# Patient Record
Sex: FEMALE | Race: WHITE | URBAN - NONMETROPOLITAN AREA
[De-identification: names, ages, dates, MRNs, and addresses within clinical notes are randomized per-mention and may not be internally consistent; named-entity substitution may affect disease eponyms.]

---

## 2024-01-18 ENCOUNTER — TELEPHONE (OUTPATIENT)
Dept: OBGYN CLINIC | Age: 34
End: 2024-01-18

## 2024-01-18 NOTE — TELEPHONE ENCOUNTER
Pt medical records have been received.  pt is 36.5 weeks  and there are complications. records were just scanned in.  The pt stated that the baby has XXY syndrome.   Pt has gestational diabetes and a heavy bleeder during delivery.  Please review and contact the pt.

## 2024-01-29 ENCOUNTER — TELEPHONE (OUTPATIENT)
Dept: OBGYN CLINIC | Age: 34
End: 2024-01-29

## 2024-02-01 ENCOUNTER — INITIAL PRENATAL (OUTPATIENT)
Dept: OBGYN CLINIC | Age: 34
End: 2024-02-01

## 2024-02-01 VITALS — WEIGHT: 210 LBS | DIASTOLIC BLOOD PRESSURE: 72 MMHG | BODY MASS INDEX: 32.89 KG/M2 | SYSTOLIC BLOOD PRESSURE: 112 MMHG

## 2024-02-01 DIAGNOSIS — O24.410 DIET CONTROLLED GESTATIONAL DIABETES MELLITUS (GDM) IN THIRD TRIMESTER: ICD-10-CM

## 2024-02-01 DIAGNOSIS — L29.3 GENITAL PRURITUS: ICD-10-CM

## 2024-02-01 DIAGNOSIS — Z36.85 ENCOUNTER FOR ANTENATAL SCREENING FOR STREPTOCOCCUS B: ICD-10-CM

## 2024-02-01 DIAGNOSIS — O26.86 PUPP (PRURITIC URTICARIAL PAPULES AND PLAQUES OF PREGNANCY): ICD-10-CM

## 2024-02-01 DIAGNOSIS — O09.523 AMA (ADVANCED MATERNAL AGE) MULTIGRAVIDA 35+, THIRD TRIMESTER: ICD-10-CM

## 2024-02-01 DIAGNOSIS — O09.90 SUPERVISION OF HIGH RISK PREGNANCY, ANTEPARTUM: Primary | ICD-10-CM

## 2024-02-01 RX ORDER — TRIAMCINOLONE ACETONIDE 0.25 MG/G
OINTMENT TOPICAL
Qty: 1 EACH | Refills: 1 | Status: SHIPPED | OUTPATIENT
Start: 2024-02-01 | End: 2024-02-02 | Stop reason: SDUPTHER

## 2024-02-01 NOTE — PROGRESS NOTES
Kat Moise is a 33 y.o. female 37w3d who presents to transfer prenatal visit.  The patient was seen and evaluated. There was positive fetal movements. No contractions, bleeding or leakage of fluid. Signs and symptoms of  labor as well as labor were reviewed. The S/S of Pre-Eclampsia were reviewed with the patient in detail. She is to report any of these if they occur. She currently denies any of these.  Pt was being seen in [place], her last appt was over a month ago.   She failed her Glucose 1 hr and 3 hr glucose.   Pt is checking her blood sugars 3 times per day, fasting in am (95), and 2 hours after meals (110-112). If one hour after meals they are around 125.   Pt feels like she may have had occasional contractions.        Kat Moise is a 33 y.o. female with the following history as recorded in [employer]:  There are no problems to display for this patient.    Current Outpatient Medications on File Prior to Visit   Medication Sig Dispense Refill    Prenatal Vit-DSS-Fe Cbn-FA (PRENATAL AD PO) Take  by mouth.       No current facility-administered medications on file prior to visit.     Allergies: Keflex [cephalexin]  History reviewed. No pertinent past medical history.  History reviewed. No pertinent surgical history.  History reviewed. No pertinent family history.  Social History     Tobacco Use    Smoking status: Every Day     Current packs/day: 0.50     Types: Cigarettes    Smokeless tobacco: Not on file   Substance Use Topics    Alcohol use: No         Mother's Prenatal Vitals  BP: 112/72  Weight - Scale: 95.3 kg (210 lb)  Patient Position: Lying left side  Prenatal Fetal Information  Fetal HR: nst 141  Movement: Present  Physical Exam  Constitutional:       General: She is not in acute distress.     Appearance: Normal appearance. She is not ill-appearing, toxic-appearing or diaphoretic.   HENT:      Head: Normocephalic and atraumatic.   Eyes:      Extraocular Movements: Extraocular 
Patient presents today for routine prenatal care. Pt denies any vaginal leaking bleeding or contractions. + Fetal movement.   Pt states baby has been breech.  
10-Aug-2023 21:32

## 2024-02-02 DIAGNOSIS — Z11.3 SCREENING FOR STD (SEXUALLY TRANSMITTED DISEASE): Primary | ICD-10-CM

## 2024-02-02 DIAGNOSIS — L29.3 GENITAL PRURITUS: ICD-10-CM

## 2024-02-02 LAB — GP B STREP DNA SPEC QL NAA+PROBE: NOT DETECTED

## 2024-02-02 RX ORDER — AZITHROMYCIN 250 MG/1
250 TABLET, FILM COATED ORAL SEE ADMIN INSTRUCTIONS
Qty: 6 TABLET | Refills: 0 | Status: SHIPPED | OUTPATIENT
Start: 2024-02-02 | End: 2024-02-07

## 2024-02-02 RX ORDER — BETAMETHASONE DIPROPIONATE 0.05 %
OINTMENT (GRAM) TOPICAL
Qty: 45 G | Refills: 0 | Status: ON HOLD | OUTPATIENT
Start: 2024-02-02 | End: 2024-02-10

## 2024-02-02 RX ORDER — LORATADINE 10 MG/1
10 TABLET ORAL DAILY
Qty: 30 TABLET | Refills: 0 | Status: ON HOLD | OUTPATIENT
Start: 2024-02-02 | End: 2024-02-08

## 2024-02-02 RX ORDER — TRIAMCINOLONE ACETONIDE 0.25 MG/G
OINTMENT TOPICAL
Qty: 1 EACH | Refills: 1 | Status: SHIPPED | OUTPATIENT
Start: 2024-02-02 | End: 2024-02-09

## 2024-02-05 ENCOUNTER — HOSPITAL ENCOUNTER (OUTPATIENT)
Age: 34
Discharge: HOME | End: 2024-02-05
Attending: OBSTETRICS & GYNECOLOGY | Admitting: OBSTETRICS & GYNECOLOGY
Payer: MEDICAID

## 2024-02-05 PROCEDURE — 99213 OFFICE O/P EST LOW 20 MIN: CPT

## 2024-02-05 NOTE — FLOWSHEET NOTE
Pt presents to floor with complaints of lower abdominal pain and contractions.  She states no vaginal bleeding or loss of fluid.  Pt reports positive fetal movement.  Pt gowned, clean catch urine obtained, placed on monitors and admitted to observation.

## 2024-02-05 NOTE — DISCHARGE INSTRUCTIONS
LABOR AND DELIVERY - OBSERVATION DISCHARGE INSTRUCTIONS    TERM LABOR: RETURN TO HOSPITAL IF:  x__There is a leaking or sudden gush of fluid from the vagina (note color, odor, time and amount of fluid)    x__ You have bright red vaginal bleeding    x__You begin to have regular contractions, occuring every 2-3 minutes, each contraction lasting 45-60 seconds for one hour. Time contractions from beginning of one to the beginning of the next. True contractions have a regular rate and become progressively closer. They are not changed by position change and are usually more uncomfortable when walking.       NOTE: If you do not begin to feel better, or you have any questions, contact your physician or call [phone], or return to the hospital.

## 2024-02-06 ENCOUNTER — TELEPHONE (OUTPATIENT)
Dept: OBGYN CLINIC | Age: 34
End: 2024-02-06

## 2024-02-06 NOTE — TELEPHONE ENCOUNTER
Called pt to let her know that MFM could see her sooner 2/12/24 at 12:40pm. No answer lvm, for pt to return call.   Mallika DUMONT

## 2024-02-06 NOTE — TELEPHONE ENCOUNTER
Called & spoke with pt. Pt is scheduled for induction 2/8/24 at 9pm w/no restrictions. Pt was instructed to go through the ER, then go to the 2nd floor to L&D. Pt verbalized understanding.   Mallika DUMONT

## 2024-02-06 NOTE — TELEPHONE ENCOUNTER
Apt is canceled w/MFM pt is scheduled for induction 2/8/24, refer to telephone encounter.   Mallika DUMONT

## 2024-02-08 ENCOUNTER — HOSPITAL ENCOUNTER (INPATIENT)
Age: 34
LOS: 2 days | Discharge: HOME | End: 2024-02-10
Attending: OBSTETRICS & GYNECOLOGY | Admitting: OBSTETRICS & GYNECOLOGY
Payer: MEDICAID

## 2024-02-08 ENCOUNTER — APPOINTMENT (OUTPATIENT)
Dept: LABOR AND DELIVERY | Age: 34
End: 2024-02-08
Payer: MEDICAID

## 2024-02-08 DIAGNOSIS — Z3A.38 38 WEEKS GESTATION OF PREGNANCY: ICD-10-CM

## 2024-02-08 LAB
ABO/RH: NORMAL
ABO/RH: NORMAL
AMPHET UR QL SCN: NEGATIVE
ANTIBODY SCREEN: NORMAL
BARBITURATES UR QL SCN: NEGATIVE
BASOPHILS # BLD: 0 K/UL (ref 0–0.2)
BASOPHILS NFR BLD: 0.3 % (ref 0–1)
BENZODIAZ UR QL SCN: NEGATIVE
BUPRENORPHINE URINE: NEGATIVE
C TRACH DNA CVX QL NAA+PROBE: NOT DETECTED
CANNABINOIDS UR QL SCN: POSITIVE
COCAINE UR QL SCN: NEGATIVE
DRUG SCREEN COMMENT UR-IMP: ABNORMAL
EOSINOPHIL # BLD: 0.1 K/UL (ref 0–0.6)
EOSINOPHIL NFR BLD: 0.7 % (ref 0–5)
ERYTHROCYTE [DISTWIDTH] IN BLOOD BY AUTOMATED COUNT: 15.6 % (ref 11.5–14.5)
FENTANYL SCREEN, URINE: NEGATIVE
HCT VFR BLD AUTO: 30.8 % (ref 37–47)
HGB BLD-MCNC: 10.1 G/DL (ref 12–16)
IMM GRANULOCYTES # BLD: 0.1 K/UL
LYMPHOCYTES # BLD: 3.5 K/UL (ref 1.1–4.5)
LYMPHOCYTES NFR BLD: 22.1 % (ref 20–40)
MCH RBC QN AUTO: 31.4 PG (ref 27–31)
MCHC RBC AUTO-ENTMCNC: 32.8 G/DL (ref 33–37)
MCV RBC AUTO: 95.7 FL (ref 81–99)
METHADONE UR QL SCN: NEGATIVE
METHAMPHETAMINE, URINE: NEGATIVE
MONOCYTES # BLD: 0.9 K/UL (ref 0–0.9)
MONOCYTES NFR BLD: 5.4 % (ref 0–10)
N GONORRHOEA DNA CERV MUCUS QL NAA+PROBE: NOT DETECTED
NEUTROPHILS # BLD: 11.2 K/UL (ref 1.5–7.5)
NEUTS SEG NFR BLD: 71.1 % (ref 50–65)
OPIATES UR QL SCN: NEGATIVE
OXYCODONE UR QL SCN: NEGATIVE
PCP UR QL SCN: NEGATIVE
PLATELET # BLD AUTO: 327 K/UL (ref 130–400)
PMV BLD AUTO: 10.2 FL (ref 9.4–12.3)
RBC # BLD AUTO: 3.22 M/UL (ref 4.2–5.4)
T VAGINALIS DNA GENITAL QL NAA+PROBE: NOT DETECTED
TRICYCLIC, URINE: NEGATIVE
WBC # BLD AUTO: 15.8 K/UL (ref 4.8–10.8)

## 2024-02-08 PROCEDURE — 87491 CHLMYD TRACH DNA AMP PROBE: CPT

## 2024-02-08 PROCEDURE — 86592 SYPHILIS TEST NON-TREP QUAL: CPT

## 2024-02-08 PROCEDURE — 85025 COMPLETE CBC W/AUTO DIFF WBC: CPT

## 2024-02-08 PROCEDURE — 86850 RBC ANTIBODY SCREEN: CPT

## 2024-02-08 PROCEDURE — 1220000000 HC SEMI PRIVATE OB R&B

## 2024-02-08 PROCEDURE — 87591 N.GONORRHOEAE DNA AMP PROB: CPT

## 2024-02-08 PROCEDURE — 80307 DRUG TEST PRSMV CHEM ANLYZR: CPT

## 2024-02-08 PROCEDURE — 86901 BLOOD TYPING SEROLOGIC RH(D): CPT

## 2024-02-08 PROCEDURE — 87661 TRICHOMONAS VAGINALIS AMPLIF: CPT

## 2024-02-08 PROCEDURE — 86900 BLOOD TYPING SEROLOGIC ABO: CPT

## 2024-02-08 PROCEDURE — G0480 DRUG TEST DEF 1-7 CLASSES: HCPCS

## 2024-02-08 PROCEDURE — 36415 COLL VENOUS BLD VENIPUNCTURE: CPT

## 2024-02-08 RX ORDER — MISOPROSTOL 200 UG/1
800 TABLET ORAL PRN
Status: DISCONTINUED | OUTPATIENT
Start: 2024-02-08 | End: 2024-02-10 | Stop reason: HOSPADM

## 2024-02-08 RX ORDER — SODIUM CHLORIDE 0.9 % (FLUSH) 0.9 %
5-40 SYRINGE (ML) INJECTION EVERY 12 HOURS SCHEDULED
Status: DISCONTINUED | OUTPATIENT
Start: 2024-02-08 | End: 2024-02-09

## 2024-02-08 RX ORDER — SODIUM CHLORIDE 9 MG/ML
25 INJECTION, SOLUTION INTRAVENOUS PRN
Status: DISCONTINUED | OUTPATIENT
Start: 2024-02-08 | End: 2024-02-09

## 2024-02-08 RX ORDER — SODIUM CHLORIDE, SODIUM LACTATE, POTASSIUM CHLORIDE, AND CALCIUM CHLORIDE .6; .31; .03; .02 G/100ML; G/100ML; G/100ML; G/100ML
500 INJECTION, SOLUTION INTRAVENOUS PRN
Status: DISCONTINUED | OUTPATIENT
Start: 2024-02-08 | End: 2024-02-09

## 2024-02-08 RX ORDER — DOCUSATE SODIUM 100 MG/1
100 CAPSULE, LIQUID FILLED ORAL 2 TIMES DAILY
Status: DISCONTINUED | OUTPATIENT
Start: 2024-02-08 | End: 2024-02-09

## 2024-02-08 RX ORDER — SODIUM CHLORIDE 0.9 % (FLUSH) 0.9 %
5-40 SYRINGE (ML) INJECTION PRN
Status: DISCONTINUED | OUTPATIENT
Start: 2024-02-08 | End: 2024-02-09

## 2024-02-08 RX ORDER — SODIUM CHLORIDE, SODIUM LACTATE, POTASSIUM CHLORIDE, AND CALCIUM CHLORIDE .6; .31; .03; .02 G/100ML; G/100ML; G/100ML; G/100ML
1000 INJECTION, SOLUTION INTRAVENOUS PRN
Status: DISCONTINUED | OUTPATIENT
Start: 2024-02-08 | End: 2024-02-09

## 2024-02-08 RX ORDER — METHYLERGONOVINE MALEATE 0.2 MG/ML
200 INJECTION INTRAVENOUS PRN
Status: DISCONTINUED | OUTPATIENT
Start: 2024-02-08 | End: 2024-02-10 | Stop reason: HOSPADM

## 2024-02-08 RX ORDER — SODIUM CHLORIDE, SODIUM LACTATE, POTASSIUM CHLORIDE, CALCIUM CHLORIDE 600; 310; 30; 20 MG/100ML; MG/100ML; MG/100ML; MG/100ML
INJECTION, SOLUTION INTRAVENOUS CONTINUOUS
Status: DISCONTINUED | OUTPATIENT
Start: 2024-02-08 | End: 2024-02-09

## 2024-02-08 RX ORDER — ONDANSETRON 2 MG/ML
4 INJECTION INTRAMUSCULAR; INTRAVENOUS EVERY 6 HOURS PRN
Status: DISCONTINUED | OUTPATIENT
Start: 2024-02-08 | End: 2024-02-09 | Stop reason: SDUPTHER

## 2024-02-08 RX ORDER — CARBOPROST TROMETHAMINE 250 UG/ML
250 INJECTION, SOLUTION INTRAMUSCULAR PRN
Status: DISCONTINUED | OUTPATIENT
Start: 2024-02-08 | End: 2024-02-10 | Stop reason: HOSPADM

## 2024-02-08 SDOH — ECONOMIC STABILITY: INCOME INSECURITY: HOW HARD IS IT FOR YOU TO PAY FOR THE VERY BASICS LIKE FOOD, HOUSING, MEDICAL CARE, AND HEATING?: SOMEWHAT HARD

## 2024-02-08 SDOH — ECONOMIC STABILITY: FOOD INSECURITY: WITHIN THE PAST 12 MONTHS, YOU WORRIED THAT YOUR FOOD WOULD RUN OUT BEFORE YOU GOT MONEY TO BUY MORE.: NEVER TRUE

## 2024-02-08 SDOH — ECONOMIC STABILITY: INCOME INSECURITY: IN THE PAST 12 MONTHS, HAS THE ELECTRIC, GAS, OIL, OR WATER COMPANY THREATENED TO SHUT OFF SERVICE IN YOUR HOME?: NO

## 2024-02-08 ASSESSMENT — PATIENT HEALTH QUESTIONNAIRE - PHQ9
2. FEELING DOWN, DEPRESSED OR HOPELESS: 0
SUM OF ALL RESPONSES TO PHQ QUESTIONS 1-9: 0
SUM OF ALL RESPONSES TO PHQ QUESTIONS 1-9: 0
SUM OF ALL RESPONSES TO PHQ9 QUESTIONS 1 & 2: 0
SUM OF ALL RESPONSES TO PHQ QUESTIONS 1-9: 0
1. LITTLE INTEREST OR PLEASURE IN DOING THINGS: 0
SUM OF ALL RESPONSES TO PHQ QUESTIONS 1-9: 0

## 2024-02-09 ENCOUNTER — ANESTHESIA EVENT (OUTPATIENT)
Dept: LABOR AND DELIVERY | Age: 34
End: 2024-02-09
Payer: MEDICAID

## 2024-02-09 ENCOUNTER — ANESTHESIA (OUTPATIENT)
Dept: LABOR AND DELIVERY | Age: 34
End: 2024-02-09
Payer: MEDICAID

## 2024-02-09 LAB
ABO/RH: NORMAL
ALLENS TEST: ABNORMAL
BASE EXCESS ARTERIAL: -1 MMOL/L (ref -2–2)
BASOPHILS # BLD: 0.1 K/UL (ref 0–0.2)
BASOPHILS # BLD: 0.1 K/UL (ref 0–0.2)
BASOPHILS NFR BLD: 0.3 % (ref 0–1)
BASOPHILS NFR BLD: 0.3 % (ref 0–1)
CARBOXYHEMOGLOBIN ARTERIAL: 1.8 % (ref 0–5)
EOSINOPHIL # BLD: 0.1 K/UL (ref 0–0.6)
EOSINOPHIL # BLD: 0.1 K/UL (ref 0–0.6)
EOSINOPHIL NFR BLD: 0.3 % (ref 0–5)
EOSINOPHIL NFR BLD: 0.6 % (ref 0–5)
ERYTHROCYTE [DISTWIDTH] IN BLOOD BY AUTOMATED COUNT: 15.7 % (ref 11.5–14.5)
ERYTHROCYTE [DISTWIDTH] IN BLOOD BY AUTOMATED COUNT: 15.8 % (ref 11.5–14.5)
FETAL SCREEN: NORMAL
HCO3 ARTERIAL: 27.3 MMOL/L (ref 22–26)
HCT VFR BLD AUTO: 27.4 % (ref 37–47)
HCT VFR BLD AUTO: 28.4 % (ref 37–47)
HEMOGLOBIN, ART, EXTENDED: 16.4 G/DL (ref 12–16)
HGB BLD-MCNC: 9 G/DL (ref 12–16)
HGB BLD-MCNC: 9.1 G/DL (ref 12–16)
IMM GRANULOCYTES # BLD: 0.1 K/UL
IMM GRANULOCYTES # BLD: 0.1 K/UL
LYMPHOCYTES # BLD: 3.2 K/UL (ref 1.1–4.5)
LYMPHOCYTES # BLD: 4.9 K/UL (ref 1.1–4.5)
LYMPHOCYTES NFR BLD: 16.7 % (ref 20–40)
LYMPHOCYTES NFR BLD: 21.6 % (ref 20–40)
MCH RBC QN AUTO: 30.6 PG (ref 27–31)
MCH RBC QN AUTO: 31.6 PG (ref 27–31)
MCHC RBC AUTO-ENTMCNC: 32 G/DL (ref 33–37)
MCHC RBC AUTO-ENTMCNC: 32.8 G/DL (ref 33–37)
MCV RBC AUTO: 95.6 FL (ref 81–99)
MCV RBC AUTO: 96.1 FL (ref 81–99)
METHEMOGLOBIN ARTERIAL: 3.5 %
MONOCYTES # BLD: 1 K/UL (ref 0–0.9)
MONOCYTES # BLD: 1.2 K/UL (ref 0–0.9)
MONOCYTES NFR BLD: 5.2 % (ref 0–10)
MONOCYTES NFR BLD: 5.4 % (ref 0–10)
NEUTROPHILS # BLD: 14.8 K/UL (ref 1.5–7.5)
NEUTROPHILS # BLD: 16.2 K/UL (ref 1.5–7.5)
NEUTS SEG NFR BLD: 71.6 % (ref 50–65)
NEUTS SEG NFR BLD: 76.9 % (ref 50–65)
O2 CONTENT ARTERIAL: 9.1 ML/DL
O2 SAT, ARTERIAL: 39.5 %
O2 THERAPY: ABNORMAL
PCO2 ARTERIAL: 58 MMHG (ref 35–45)
PH ARTERIAL: 7.28 (ref 7.35–7.45)
PLATELET # BLD AUTO: 301 K/UL (ref 130–400)
PLATELET # BLD AUTO: 328 K/UL (ref 130–400)
PMV BLD AUTO: 10.4 FL (ref 9.4–12.3)
PMV BLD AUTO: 10.7 FL (ref 9.4–12.3)
PO2 ARTERIAL: 31 MMHG (ref 80–100)
POTASSIUM BLD-SCNC: 4.7 MMOL/L
RBC # BLD AUTO: 2.85 M/UL (ref 4.2–5.4)
RBC # BLD AUTO: 2.97 M/UL (ref 4.2–5.4)
RHIG LOT NUMBER: NORMAL
SAMPLE SOURCE: ABNORMAL
WBC # BLD AUTO: 19.2 K/UL (ref 4.8–10.8)
WBC # BLD AUTO: 22.6 K/UL (ref 4.8–10.8)

## 2024-02-09 PROCEDURE — 6360000002 HC RX W HCPCS: Performed by: OBSTETRICS & GYNECOLOGY

## 2024-02-09 PROCEDURE — 1220000000 HC SEMI PRIVATE OB R&B

## 2024-02-09 PROCEDURE — 36415 COLL VENOUS BLD VENIPUNCTURE: CPT

## 2024-02-09 PROCEDURE — 59409 OBSTETRICAL CARE: CPT | Performed by: OBSTETRICS & GYNECOLOGY

## 2024-02-09 PROCEDURE — 7200000001 HC VAGINAL DELIVERY

## 2024-02-09 PROCEDURE — 2500000003 HC RX 250 WO HCPCS: Performed by: OBSTETRICS & GYNECOLOGY

## 2024-02-09 PROCEDURE — 85025 COMPLETE CBC W/AUTO DIFF WBC: CPT

## 2024-02-09 PROCEDURE — 86900 BLOOD TYPING SEROLOGIC ABO: CPT

## 2024-02-09 PROCEDURE — 10907ZC DRAINAGE OF AMNIOTIC FLUID, THERAPEUTIC FROM PRODUCTS OF CONCEPTION, VIA NATURAL OR ARTIFICIAL OPENING: ICD-10-PCS | Performed by: OBSTETRICS & GYNECOLOGY

## 2024-02-09 PROCEDURE — 36600 WITHDRAWAL OF ARTERIAL BLOOD: CPT

## 2024-02-09 PROCEDURE — 6370000000 HC RX 637 (ALT 250 FOR IP): Performed by: OBSTETRICS & GYNECOLOGY

## 2024-02-09 PROCEDURE — 2580000003 HC RX 258: Performed by: OBSTETRICS & GYNECOLOGY

## 2024-02-09 PROCEDURE — 82803 BLOOD GASES ANY COMBINATION: CPT

## 2024-02-09 PROCEDURE — 85461 HEMOGLOBIN FETAL: CPT

## 2024-02-09 PROCEDURE — 3700000025 EPIDURAL BLOCK: Performed by: NURSE ANESTHETIST, CERTIFIED REGISTERED

## 2024-02-09 PROCEDURE — 86901 BLOOD TYPING SEROLOGIC RH(D): CPT

## 2024-02-09 PROCEDURE — 6360000002 HC RX W HCPCS: Performed by: NURSE ANESTHETIST, CERTIFIED REGISTERED

## 2024-02-09 RX ORDER — IBUPROFEN 400 MG/1
800 TABLET ORAL EVERY 8 HOURS
Status: DISCONTINUED | OUTPATIENT
Start: 2024-02-09 | End: 2024-02-10 | Stop reason: HOSPADM

## 2024-02-09 RX ORDER — ACETAMINOPHEN 500 MG
1000 TABLET ORAL EVERY 8 HOURS
Status: DISCONTINUED | OUTPATIENT
Start: 2024-02-09 | End: 2024-02-10 | Stop reason: HOSPADM

## 2024-02-09 RX ORDER — NALOXONE HYDROCHLORIDE 0.4 MG/ML
INJECTION, SOLUTION INTRAMUSCULAR; INTRAVENOUS; SUBCUTANEOUS PRN
Status: DISCONTINUED | OUTPATIENT
Start: 2024-02-09 | End: 2024-02-09 | Stop reason: HOSPADM

## 2024-02-09 RX ORDER — SODIUM CHLORIDE 9 MG/ML
INJECTION, SOLUTION INTRAVENOUS PRN
Status: DISCONTINUED | OUTPATIENT
Start: 2024-02-09 | End: 2024-02-10 | Stop reason: HOSPADM

## 2024-02-09 RX ORDER — ROPIVACAINE HYDROCHLORIDE 2 MG/ML
13 INJECTION, SOLUTION EPIDURAL; INFILTRATION; PERINEURAL CONTINUOUS
Status: DISCONTINUED | OUTPATIENT
Start: 2024-02-09 | End: 2024-02-09

## 2024-02-09 RX ORDER — FENTANYL CITRATE 50 UG/ML
50 INJECTION, SOLUTION INTRAMUSCULAR; INTRAVENOUS ONCE
Status: COMPLETED | OUTPATIENT
Start: 2024-02-09 | End: 2024-02-09

## 2024-02-09 RX ORDER — DOCUSATE SODIUM 100 MG/1
100 CAPSULE, LIQUID FILLED ORAL 2 TIMES DAILY
Status: DISCONTINUED | OUTPATIENT
Start: 2024-02-09 | End: 2024-02-10 | Stop reason: HOSPADM

## 2024-02-09 RX ORDER — ROPIVACAINE HYDROCHLORIDE 2 MG/ML
INJECTION, SOLUTION EPIDURAL; INFILTRATION; PERINEURAL CONTINUOUS PRN
Status: DISCONTINUED | OUTPATIENT
Start: 2024-02-09 | End: 2024-02-09 | Stop reason: SDUPTHER

## 2024-02-09 RX ORDER — IBUPROFEN 400 MG/1
800 TABLET ORAL EVERY 8 HOURS
Status: DISCONTINUED | OUTPATIENT
Start: 2024-02-09 | End: 2024-02-09

## 2024-02-09 RX ORDER — EPHEDRINE SULFATE/0.9% NACL/PF 50 MG/5 ML
5 SYRINGE (ML) INTRAVENOUS ONCE
OUTPATIENT
Start: 2024-02-09 | End: 2024-02-09

## 2024-02-09 RX ORDER — FENTANYL CITRATE-0.9 % NACL/PF 10 MCG/ML
50 PLASTIC BAG, INJECTION (ML) INTRAVENOUS ONCE
Status: DISCONTINUED | OUTPATIENT
Start: 2024-02-09 | End: 2024-02-09

## 2024-02-09 RX ORDER — SODIUM CHLORIDE 0.9 % (FLUSH) 0.9 %
5-40 SYRINGE (ML) INJECTION PRN
Status: DISCONTINUED | OUTPATIENT
Start: 2024-02-09 | End: 2024-02-10 | Stop reason: HOSPADM

## 2024-02-09 RX ORDER — SODIUM CHLORIDE 0.9 % (FLUSH) 0.9 %
5-40 SYRINGE (ML) INJECTION EVERY 12 HOURS SCHEDULED
Status: DISCONTINUED | OUTPATIENT
Start: 2024-02-09 | End: 2024-02-10 | Stop reason: HOSPADM

## 2024-02-09 RX ORDER — ONDANSETRON 2 MG/ML
4 INJECTION INTRAMUSCULAR; INTRAVENOUS EVERY 6 HOURS PRN
Status: DISCONTINUED | OUTPATIENT
Start: 2024-02-09 | End: 2024-02-09 | Stop reason: HOSPADM

## 2024-02-09 RX ORDER — BUPIVACAINE HYDROCHLORIDE 2.5 MG/ML
INJECTION, SOLUTION EPIDURAL; INFILTRATION; INTRACAUDAL PRN
Status: DISCONTINUED | OUTPATIENT
Start: 2024-02-09 | End: 2024-02-09 | Stop reason: SDUPTHER

## 2024-02-09 RX ADMIN — ACETAMINOPHEN 1000 MG: 500 TABLET ORAL at 11:54

## 2024-02-09 RX ADMIN — TRANEXAMIC ACID 1000 MG: 1 INJECTION, SOLUTION INTRAVENOUS at 15:44

## 2024-02-09 RX ADMIN — DOCUSATE SODIUM 100 MG: 100 CAPSULE, LIQUID FILLED ORAL at 21:24

## 2024-02-09 RX ADMIN — SODIUM CHLORIDE, PRESERVATIVE FREE 10 ML: 5 INJECTION INTRAVENOUS at 21:25

## 2024-02-09 RX ADMIN — SODIUM CHLORIDE, POTASSIUM CHLORIDE, SODIUM LACTATE AND CALCIUM CHLORIDE: 600; 310; 30; 20 INJECTION, SOLUTION INTRAVENOUS at 00:56

## 2024-02-09 RX ADMIN — Medication 2 MILLI-UNITS/MIN: at 02:02

## 2024-02-09 RX ADMIN — METHYLERGONOVINE MALEATE 200 MCG: 0.2 INJECTION, SOLUTION INTRAMUSCULAR; INTRAVENOUS at 14:38

## 2024-02-09 RX ADMIN — BUPIVACAINE HYDROCHLORIDE 10 ML: 2.5 INJECTION, SOLUTION EPIDURAL; INFILTRATION; INTRACAUDAL; PERINEURAL at 10:49

## 2024-02-09 RX ADMIN — ACETAMINOPHEN 1000 MG: 500 TABLET ORAL at 19:17

## 2024-02-09 RX ADMIN — IBUPROFEN 800 MG: 400 TABLET, FILM COATED ORAL at 21:24

## 2024-02-09 RX ADMIN — ROPIVACAINE HYDROCHLORIDE 13 ML/HR: 2 INJECTION, SOLUTION EPIDURAL; INFILTRATION at 10:50

## 2024-02-09 RX ADMIN — MISOPROSTOL 800 MCG: 200 TABLET ORAL at 14:35

## 2024-02-09 RX ADMIN — FENTANYL CITRATE 50 MCG: 50 INJECTION, SOLUTION INTRAMUSCULAR; INTRAVENOUS at 14:40

## 2024-02-09 RX ADMIN — HUMAN RHO(D) IMMUNE GLOBULIN 300 MCG: 300 INJECTION, SOLUTION INTRAMUSCULAR at 21:26

## 2024-02-09 RX ADMIN — IBUPROFEN 800 MG: 400 TABLET, FILM COATED ORAL at 14:08

## 2024-02-09 ASSESSMENT — PAIN DESCRIPTION - LOCATION
LOCATION: VAGINA
LOCATION: ABDOMEN

## 2024-02-09 ASSESSMENT — PAIN DESCRIPTION - DESCRIPTORS
DESCRIPTORS: CRAMPING
DESCRIPTORS: CRAMPING

## 2024-02-09 ASSESSMENT — PAIN SCALES - GENERAL
PAINLEVEL_OUTOF10: 4
PAINLEVEL_OUTOF10: 5
PAINLEVEL_OUTOF10: 5
PAINLEVEL_OUTOF10: 7

## 2024-02-09 ASSESSMENT — PAIN DESCRIPTION - ORIENTATION
ORIENTATION: LOWER
ORIENTATION: LOWER

## 2024-02-09 ASSESSMENT — PAIN - FUNCTIONAL ASSESSMENT
PAIN_FUNCTIONAL_ASSESSMENT: ACTIVITIES ARE NOT PREVENTED
PAIN_FUNCTIONAL_ASSESSMENT: ACTIVITIES ARE NOT PREVENTED

## 2024-02-09 NOTE — PLAN OF CARE
Problem: Vaginal Birth or  Section  Goal: Fetal and maternal status remain reassuring during the birth process  Description:  Birth OB-Pregnancy care plan goal which identifies if the fetal and maternal status remain reassuring during the birth process  Outcome: Progressing  Flowsheets (Taken 2024 0600)  Fetal and Maternal Status Remain Reassuring During the Birth Process:   Monitor labor progression (Vaginal delivery)   Monitor vital signs   Monitor fetal heart rate   Monitor uterine activity     Problem: Pain  Goal: Verbalizes/displays adequate comfort level or baseline comfort level  Outcome: Progressing     Problem: Safety - Adult  Goal: Free from fall injury  Outcome: Progressing

## 2024-02-09 NOTE — FLOWSHEET NOTE
Pt here for scheduled induction. Pt reports +fetal movement, denies any leaking of fluid or vaginal bleeding, EFM and toco applied to soft, non tender abd.

## 2024-02-09 NOTE — ANESTHESIA PROCEDURE NOTES
Epidural Block    Patient location during procedure: OB  Start time: 2/9/2024 10:43 AM  End time: 2/9/2024 10:55 AM  Reason for block: labor epidural  Staffing  Resident/CRNA: Rodrick Che APRN - CRNA  Performed by: Rodrick Che APRN - CRNA  Authorized by: Rodrick Che APRN - CRNA    Epidural  Patient position: sitting  Prep: Betadine  Patient monitoring: continuous pulse ox  Approach: midline  Location: L3-4  Injection technique: DANELLE air  Provider prep: mask, sterile gloves and sterile gown  Needle  Needle type: Tuohy   Needle gauge: 17 G  Needle length: 3.5 in  Needle insertion depth: 3 cm  Catheter type: end hole  Catheter size: 19 G  Catheter at skin depth: 13 cm  Test dose: negative  Kit: [id number]  Lot number: [id number] Secured: tegaderm and tape  Assessment  Sensory level: T10  Hemodynamics: stable  Outcomes: uncomplicated and patient tolerated procedure well  Preanesthetic Checklist  Completed: patient identified, IV checked, site marked, risks and benefits discussed, surgical/procedural consents, equipment checked, pre-op evaluation, timeout performed, anesthesia consent given, oxygen available, monitors applied/VS acknowledged, fire risk safety assessment completed and verbalized and blood product R/B/A discussed and consented

## 2024-02-09 NOTE — DISCHARGE INSTRUCTIONS
majority of Commercial, Medicare, and Medicaid    [facility]   A New Day Counseling Service, [facility]   [address] [id number] [phone]   Substance Abuse Counseling   Private Pay  [facility]   [address] [facility] [address]   [email or web address].[email or web address]   [phone]   Crisis Trauma, EMDR, Addiction  Patients under 18 years old accepted   Services offered at reduced cost  [facility] [place] [address] [facility], [address]   [email or web address]   [phone] Trauma Treatment Specialist, with a focus on Holistic Psychotherapy including Cellular Release Therapy, and Hypnosis for Healing   Patients under 18 years old accepted    Sliding Scale Available  [facility]   [address] [facility] [place] [id number]  [email or web address]   [phone] Fridays 9:00AM-5:00PM   Trauma Counseling (Child/Adolescent/Teen/Adult)); Biblical Counseling (All services with a Biblical Worldview)   Patients under 18 years old accepted   Private Pay (At this time)  [facility]   [address], [address] , [address].[facility]   [phone] (REST)   Trauma counseling for crime victims   Patients under 18 years old accepted   Services are [address] [address]   [email or web address]   [phone]   Patients under 18 years old accepted  Accepts Medicaid, Private Insurances  [facility]   [address] [facility] [address]   www.[email or web address]   [phone]   Accepts Medicaid  [facility]   [address], [place] [address] [phone] (Can Text)   By Appointment Only   Patients under 18 years old accepted  Private Pay    Online / Telehealth Only  [facility]   [address], [address] [address] [place], [place]  pregnancy and parenting.  Website: [email or web address]  [address] [place] [address]  [phone]    KYNECTOR  Speak with a local KYNECTOR to see if you qualify for [place] Medicaid primary or secondary coverage.  Free help with your benefit application is available through a KYNECTOR.   Website: https://kynect.ky.gov/benefits/s/fxjc-yzzc-fjcfbsqjv    [employer]  Free resource to assist with financial assistance programs at outside hospitals  Website: https://Integrated biometricsor.TicketFire/    CancerCare  Financial assistance for cancer-related costs and co-pays  Website: https://www.cancercare.org/financial  429.672.0105    Mammogram and Ultrasound Assistance (Breast Cancer Assistance Program)  Provides assistance to uninsured and underinsured individuals for breast cancer screening  Website: https://www.abcf.org/our-programs/breast-cancer-assistance-program/  689.900.6236    Free Colon Cancer Screening Program  Provides assistance to uninsured and underinsured [place] residents based on financial eligibility.  Website: https://kycancerlink.org/colon-cancer/  476.281.0324    Employment Resources:  Employment & Training Office [phone]) [phone]  7:30 AM - 5?PM Monday - Tuesday  7:30 AM - 4:30 PM Wednesday - Thursday  7:30 AM - 12 PM Friday  www.oet.[place].gov   [employer] Services [phone]  8 AM - 5?PM Monday - Friday  [employer] [phone]) [phone]  www.[employer]   [employer] [phone]  8 AM - 5?PM Monday - Friday  www.people-lease.com   [employer] [phone]  8 AM - 12?PM, 1 PM - 5 PM Monday - Friday  www.[employer] Scheduling.Fashioholic   [employer] [phone]  8 AM - 5?PM Monday - Thursday  8 AM - 4 PM Friday  www.permaTechniScanstaff.com   [facility] [phone]  [facility] [phone]  8 AM - 5?PM Monday - Friday  [employer] [phone]  7 AM - 6?PM Monday - Friday  www.tradesmeninternational.com   [facility] [phone]  8 AM - 5?PM

## 2024-02-09 NOTE — ANESTHESIA POSTPROCEDURE EVALUATION
Department of Anesthesiology  Postprocedure Note    Patient: Kat Moise  MRN: [id number]  YOB: 1990  Date of evaluation: 2/9/2024    Procedure Summary       Date: 02/09/24 Room / Location:     Anesthesia Start: 1043 Anesthesia Stop:     Procedure: Labor Analgesia Diagnosis:     Scheduled Providers:  Responsible Provider: Rodrick Che APRN - CRNA    Anesthesia Type: epidural ASA Status: 2            Anesthesia Type: No value filed.    Dina Phase I:      Dina Phase II:      Anesthesia Post Evaluation    Patient location during evaluation: floor  Patient participation: complete - patient participated  Level of consciousness: awake and alert  Pain score: 0  Airway patency: patent  Nausea & Vomiting: no nausea and no vomiting  Cardiovascular status: blood pressure returned to baseline and hemodynamically stable  Respiratory status: room air and acceptable  Hydration status: euvolemic  Multimodal analgesia pain management approach  Pain management: adequate    No notable events documented.

## 2024-02-09 NOTE — FLOWSHEET NOTE
Patient called out for increased bleeding at 1425. Upon entering room noted moderate amount of bright red blood on bed pad. Called for assistance and PPH cart brought to bedside. Dr Russell called immediately and arrived at bedside at 1435. Manual removal of clots and fundal massage by Dr. Russell. See MAR and flowsheet for vital signs and medications given. After interventions, fundus firm and minimal bleeding.     Nadine Oquendo RN

## 2024-02-09 NOTE — ANESTHESIA PRE PROCEDURE
800 mcg Rectal PRN Mindy Bell MD       • tranexamic acid (CYKLOKAPRON) 1,000 mg in sodium chloride 0.9 % 100 mL IVPB  1,000 mg IntraVENous Once PRN Mindy Bell MD       • oxytocin (PITOCIN) 30 units in 500 mL infusion  87.3 janna-units/min IntraVENous Continuous PRN Mindy Bell MD        And   • oxytocin (PITOCIN) 10 unit bolus from the bag  10 Units IntraVENous PRN Mindy Bell MD       • ondansetron (ZOFRAN) injection 4 mg  4 mg IntraVENous Q6H PRN Mindy Bell MD       • docusate sodium (COLACE) capsule 100 mg  100 mg Oral BID Mindy Bell MD           Allergies:    Allergies   Allergen Reactions   • Keflex [Cephalexin]        Problem List:    Patient Active Problem List   Diagnosis Code   • Labor and delivery, indication for care O75.9   • 38 weeks gestation of pregnancy Z3A.38       Past Medical History:  History reviewed. No pertinent past medical history.    Past Surgical History:  History reviewed. No pertinent surgical history.    Social History:    Social History     Tobacco Use   • Smoking status: Every Day     Types: Cigarettes   • Smokeless tobacco: Not on file   Substance Use Topics   • Alcohol use: No                                Ready to quit: Not Answered  Counseling given: Not Answered      Vital Signs (Current):   Vitals:    02/09/24 0730 02/09/24 0815 02/09/24 0910 02/09/24 0930   BP: 131/74 129/73 120/67 (!) 107/55   Pulse: 86 86 84 82   Resp: 16      Temp: 98 °F (36.7 °C)      TempSrc:       SpO2: 100%      Weight:       Height:                                                  BP Readings from Last 3 Encounters:   02/09/24 (!) 107/55   02/01/24 112/72       NPO Status:                                                                                 BMI:   Wt Readings from Last 3 Encounters:   02/08/24 98 kg (216 lb)   02/01/24 95.3 kg (210 lb)     Body mass index is 34.86 kg/m².    CBC:   Lab Results   Component Value

## 2024-02-09 NOTE — FLOWSHEET NOTE
Call placed to Dr Russell about induction arrival, states patient may be taken off monitor until we start pitocin at 0200. Orders received to start pitocin at 2 mu at 0200, then increase at 0600. Pt updated on plan of care.

## 2024-02-10 VITALS
TEMPERATURE: 98.2 F | OXYGEN SATURATION: 100 % | BODY MASS INDEX: 34.72 KG/M2 | HEIGHT: 66 IN | WEIGHT: 216 LBS | DIASTOLIC BLOOD PRESSURE: 57 MMHG | RESPIRATION RATE: 18 BRPM | HEART RATE: 75 BPM | SYSTOLIC BLOOD PRESSURE: 104 MMHG

## 2024-02-10 LAB
ERYTHROCYTE [DISTWIDTH] IN BLOOD BY AUTOMATED COUNT: 15.8 % (ref 11.5–14.5)
HCT VFR BLD AUTO: 22.3 % (ref 37–47)
HGB BLD-MCNC: 7.1 G/DL (ref 12–16)
MCH RBC QN AUTO: 31.6 PG (ref 27–31)
MCHC RBC AUTO-ENTMCNC: 31.8 G/DL (ref 33–37)
MCV RBC AUTO: 99.1 FL (ref 81–99)
PLATELET # BLD AUTO: 274 K/UL (ref 130–400)
PMV BLD AUTO: 10.9 FL (ref 9.4–12.3)
RBC # BLD AUTO: 2.25 M/UL (ref 4.2–5.4)
WBC # BLD AUTO: 13.3 K/UL (ref 4.8–10.8)

## 2024-02-10 PROCEDURE — 6370000000 HC RX 637 (ALT 250 FOR IP): Performed by: OBSTETRICS & GYNECOLOGY

## 2024-02-10 PROCEDURE — 99239 HOSP IP/OBS DSCHRG MGMT >30: CPT | Performed by: ADVANCED PRACTICE MIDWIFE

## 2024-02-10 PROCEDURE — 85027 COMPLETE CBC AUTOMATED: CPT

## 2024-02-10 PROCEDURE — 36415 COLL VENOUS BLD VENIPUNCTURE: CPT

## 2024-02-10 RX ORDER — FERROUS SULFATE 325(65) MG
325 TABLET ORAL 2 TIMES DAILY
Qty: 60 TABLET | Refills: 1 | Status: SHIPPED | OUTPATIENT
Start: 2024-02-10

## 2024-02-10 RX ORDER — ACETAMINOPHEN AND CODEINE PHOSPHATE 300; 30 MG/1; MG/1
1 TABLET ORAL EVERY 4 HOURS PRN
Qty: 8 TABLET | Refills: 0 | Status: SHIPPED | OUTPATIENT
Start: 2024-02-10 | End: 2024-02-13

## 2024-02-10 RX ORDER — IBUPROFEN 800 MG/1
800 TABLET ORAL EVERY 8 HOURS
Qty: 120 TABLET | Refills: 3 | Status: SHIPPED | OUTPATIENT
Start: 2024-02-10

## 2024-02-10 RX ADMIN — DOCUSATE SODIUM 100 MG: 100 CAPSULE, LIQUID FILLED ORAL at 08:55

## 2024-02-10 RX ADMIN — ACETAMINOPHEN 1000 MG: 500 TABLET ORAL at 12:47

## 2024-02-10 RX ADMIN — ACETAMINOPHEN 1000 MG: 500 TABLET ORAL at 05:58

## 2024-02-10 ASSESSMENT — PAIN SCALES - GENERAL: PAINLEVEL_OUTOF10: 4

## 2024-02-10 ASSESSMENT — PAIN DESCRIPTION - LOCATION: LOCATION: BACK

## 2024-02-10 ASSESSMENT — PAIN - FUNCTIONAL ASSESSMENT: PAIN_FUNCTIONAL_ASSESSMENT: ACTIVITIES ARE NOT PREVENTED

## 2024-02-10 ASSESSMENT — PAIN DESCRIPTION - DESCRIPTORS: DESCRIPTORS: CRAMPING

## 2024-02-10 NOTE — DISCHARGE SUMMARY
[facility] OB/[facility]   Discharge Summary    Patient Name: Kat Moise  Patient : 1990  Room/Bed: 0214/0214-01  Primary Care Physician: None, None  Admit Date: 2024  7:26 PM    Reasons for Admission on 2024  7:26 PM  38 weeks gestation of pregnancy [Z3A.38]  No comment available  Induction of Labor    Patient Active Problem List   Diagnosis     (normal spontaneous vaginal delivery)    38 weeks gestation of pregnancy       Kat Moise is a 33 y.o. year old  who presented at 38w4d gestation for IOL.  Please see H&P for detailed information.     She was admitted and progressed in labor with pitocin for induction and Epidural anesthesia to completely dilated and effaced.  No Immediate complications were encountered.   Please see procedure note for detailed information.     Her postpartum course was complicated by PPH. See H&H history below. She had no signs or symptoms of acute blood loss anemia. She is ambulating well, voiding without difficulty and her lochia is within normal limits. She is feeding by bottle without difficulty. She was stable for discharge on postpartum day 2.     Hemoglobin   Date Value Ref Range Status   02/10/2024 7.1 (L) 12.0 - 16.0 g/dL Final   2024 9.1 (L) 12.0 - 16.0 g/dL Final     Hematocrit   Date Value Ref Range Status   02/10/2024 22.3 (L) 37.0 - 47.0 % Final   2024 28.4 (L) 37.0 - 47.0 % Final         Prenatal Procedures  None    Intrapartum Procedures  Spontaneous Vaginal Delivery: See Labor and Delivery Summary    Postpartum Procedures  Cytotec, TXA, methergine     Data  Information for the patient's :  Bernardo Moise [[id number]]   male   Birth Weight: 3.38 kg (7 lb 7.2 oz)       Postpartum Day 2: Vaginal      REVIEW OF SYSTEMS  Patient is a  The patient feels well. The patient denies emotional concerns. Pain is well controlled with current medications. The baby is well. Baby is feeding via bottle. Urinary output

## 2024-02-12 LAB — RPR SER QL: NORMAL

## 2024-02-13 NOTE — L&D DELIVERY NOTE
Bernardo Moise [[id number]]      Labor Events     Labor: No   Steroids: None  Cervical Ripening Date/Time:      Antibiotics Received during Labor: No  Rupture Date/Time:      Rupture Type: Intact, AROM  Fluid Color: Clear  Fluid Odor: None  Fluid Volume: Moderate  Induction: Oxytocin  Augmentation: AROM  Labor Complications: None              Anesthesia    Method: Epidural       Labor Event Times      Labor onset date/time:  24 10:36:00     Dilation complete date/time:  24 11:06:00     Start pushing date/time:  2024 11:06:00   Decision date/time (emergent ):            Labor Length    1st stage: 0h 30m  2nd stage: 0h 09m  3rd stage: 0h 04m       Delivery Details      Delivery Date: 24 Delivery Time: 11:15:00   Delivery Type: Vaginal, Spontaneous               Presentation    Presentation: Vertex  Position: Middle  _: Occiput       Shoulder Dystocia    Shoulder Dystocia Present?: No       Assisted Delivery Details    Forceps Attempted?: No  Vacuum Extractor Attempted?: No                           Cord    Vessels: 3 Vessels  Complications: None  Delayed Cord Clamping?: Yes  Cord Clamped Date/Time: 2024 11:16:00  Cord Blood Disposition: Lab  Gases Sent?: Yes              Placenta    Date/Time: 2024 11:19:00  Removal: Spontaneous  Appearance: Intact  Disposition: Discarded       Lacerations    Episiotomy: None  Perineal Lacerations: None  Other Lacerations: no non-perineal laceration       Vaginal Counts    Initial Count Personnel: D.T CST  Initial Count Verified By: ELIZABETH TA  Intial Sponge Count: Correct Intial Needles Count: Correct Intial Instruments Count: Correct   Final Sponges Count: Correct Final Needles  Count: Correct Final Instruments Count: Correct   Final Count Personnel: D.T CST  Final Count Verified By: ELIZABETH TA  Accurate Final Count?: Yes       Blood Loss  Mother: Kat Moise #[id number]     Start of Mother's Information      Delivery Blood Loss

## 2024-02-14 ENCOUNTER — LACTATION ENCOUNTER (OUTPATIENT)
Dept: LABOR AND DELIVERY | Age: 34
End: 2024-02-14

## 2024-02-14 NOTE — H&P
Kat Moise is a 33 y.o. female patient who presents at 38 4/7 wks with GDM for labor augmentation.      History reviewed. No pertinent past medical history.  OB History          6    Para   5    Term   5            AB   1    Living   5         SAB   1    IAB        Ectopic        Molar        Multiple   0    Live Births   5              38w4d  Estimated Date of Delivery: 24  Allergies   Allergen Reactions    Keflex [Cephalexin]      Principal Problem:    38 weeks gestation of pregnancy  Active Problems:     (normal spontaneous vaginal delivery)  Resolved Problems:    * No resolved hospital problems. *    Blood pressure (!) 104/57, pulse 75, temperature 98.2 °F (36.8 °C), temperature source Temporal, resp. rate 18, height 1.676 m (5' 6\"), weight 98 kg (216 lb), SpO2 100 %, unknown if currently breastfeeding.    Maternal Medical History:   Reason for admission: Contractions.     Contractions: Onset was 13-24 hours ago.    Fetal activity: Perceived fetal activity is normal.    Prenatal complications: no prenatal complications  Prenatal Complications - Diabetes: gestational.      Maternal Exam:   Uterine Assessment: Contraction frequency is irregular.   Abdomen: Patient reports no abdominal tenderness. Fetal presentation: vertex  Introitus: Normal vulva. Normal vagina.  Pelvis: adequate for delivery.        Fetal Exam  Fetal Monitor Review: Mode: ultrasound.    Variability: moderate (6-25 bpm).    Pattern: accelerations present and no decelerations.    Fetal State Assessment: Category I - tracings are normal.      Assessment:  Active phase labor.   Membrane status: intact.   Fetal well-being: normal.   GDM  GBS negative    Plan:  1.  Admit for pitocin augmentation  2.  Routine admission labs and intrapartum care  3.  Analgesia per patient request.      Mindy Tejeda MD

## 2024-02-14 NOTE — LACTATION NOTE
This note was copied from a baby's chart.  No call, no show for rescheduled weight check,   Spoke with Grandmother Kat she states she will let mother know to bring baby in tomorrow for weight check. Explained how important it is to bring baby back as ordered for evaluation of weight and jaundice.

## 2024-02-15 ENCOUNTER — LACTATION ENCOUNTER (OUTPATIENT)
Dept: LABOR AND DELIVERY | Age: 34
End: 2024-02-15

## 2024-02-15 NOTE — LACTATION NOTE
This note was copied from a baby's chart.    This is to inform you that baby has been seen since discharge    Day of Life: 6    : 24 @ 1115    GA: 38.4    Mom's blood type: O-    Baby's blood type: O+ FELTON-    Birth weight: 7-7.2 lb (3380g)    Discharge weight: 7-5.1 lb (3320g)    Today's weight: 7-2.1 lb (3225g)    Weight loss: -4.58%    Bilizap: (draw serum if within 3 mg/dl of phototherapy on graph): 8.0      Infant feeding (type and how often in the last 24 hours): formula feeding 2 oz every 2-3 hours    Stools (in the last 24 hours): 2    Voids (in the last 24 hours): 5-6    Color: wnl  Gums: moist  Skin: warm/dry  Cord: dry  Circumcision: healed  Fontanels: soft/flat  Activity: alert/active    Education to mother: feed baby 2-3 oz every 2-3 hours     Instructions to mother: mother has an appointment with Dr. Murphy for next week

## 2024-02-21 ENCOUNTER — TELEPHONE (OUTPATIENT)
Dept: OBGYN CLINIC | Age: 34
End: 2024-02-21

## 2024-02-21 NOTE — TELEPHONE ENCOUNTER
Kat requests that a nurse  return their call. Pt  would like to know what to do about her low iron. Per pt was seen at health dept today, and they advised that transfusion was needed and to call ob to advise. Please call pt clarify. The best time to reach her is Anytime.     Thank you.